# Patient Record
Sex: FEMALE | Race: WHITE | NOT HISPANIC OR LATINO | URBAN - NONMETROPOLITAN AREA
[De-identification: names, ages, dates, MRNs, and addresses within clinical notes are randomized per-mention and may not be internally consistent; named-entity substitution may affect disease eponyms.]

---

## 2017-12-21 ENCOUNTER — APPOINTMENT (OUTPATIENT)
Age: 62
Setting detail: DERMATOLOGY
End: 2017-12-22

## 2017-12-21 DIAGNOSIS — L259 CONTACT DERMATITIS AND OTHER ECZEMA, UNSPECIFIED CAUSE: ICD-10-CM

## 2017-12-21 PROBLEM — L30.9 DERMATITIS, UNSPECIFIED: Status: ACTIVE | Noted: 2017-12-21

## 2017-12-21 PROCEDURE — OTHER TREATMENT REGIMEN: OTHER

## 2017-12-21 PROCEDURE — 99213 OFFICE O/P EST LOW 20 MIN: CPT

## 2017-12-21 ASSESSMENT — LOCATION DETAILED DESCRIPTION DERM
LOCATION DETAILED: LEFT LATERAL SUPERIOR EYELID
LOCATION DETAILED: RIGHT LATERAL SUPERIOR EYELID

## 2017-12-21 ASSESSMENT — LOCATION SIMPLE DESCRIPTION DERM
LOCATION SIMPLE: LEFT SUPERIOR EYELID
LOCATION SIMPLE: RIGHT SUPERIOR EYELID

## 2017-12-21 ASSESSMENT — LOCATION ZONE DERM: LOCATION ZONE: EYELID

## 2017-12-21 NOTE — PROCEDURE: TREATMENT REGIMEN
Plan: Briefly discussed patch testing.
Initiate Treatment: Cordran 0.05% topical ointment for up to 5 days
Otc Regimen: Vaseline, Aquaphor or Crisco shortening to eyelids when sore and irritated to keep moisturized\\n\\nPatient should discontinue use of mascara for at least one week to determine if that is the cause of rash
Detail Level: Simple
Samples Given: Cordran 0.05% ointment (x1)